# Patient Record
Sex: MALE | Race: WHITE | NOT HISPANIC OR LATINO | Employment: FULL TIME | URBAN - METROPOLITAN AREA
[De-identification: names, ages, dates, MRNs, and addresses within clinical notes are randomized per-mention and may not be internally consistent; named-entity substitution may affect disease eponyms.]

---

## 2018-03-27 ENCOUNTER — HOSPITAL ENCOUNTER (EMERGENCY)
Facility: OTHER | Age: 78
Discharge: HOME | End: 2018-03-27
Attending: EMERGENCY MEDICINE
Payer: COMMERCIAL

## 2018-03-27 ENCOUNTER — OFFICE VISIT (OUTPATIENT)
Dept: URGENT CARE | Facility: CLINIC | Age: 78
End: 2018-03-27
Payer: COMMERCIAL

## 2018-03-27 VITALS
TEMPERATURE: 98 F | RESPIRATION RATE: 16 BRPM | HEIGHT: 67 IN | BODY MASS INDEX: 21.82 KG/M2 | DIASTOLIC BLOOD PRESSURE: 74 MMHG | SYSTOLIC BLOOD PRESSURE: 140 MMHG | HEART RATE: 82 BPM | WEIGHT: 139 LBS | OXYGEN SATURATION: 98 %

## 2018-03-27 VITALS
TEMPERATURE: 98 F | SYSTOLIC BLOOD PRESSURE: 156 MMHG | HEIGHT: 68 IN | HEART RATE: 75 BPM | WEIGHT: 139 LBS | BODY MASS INDEX: 21.07 KG/M2 | DIASTOLIC BLOOD PRESSURE: 76 MMHG | OXYGEN SATURATION: 99 % | RESPIRATION RATE: 18 BRPM

## 2018-03-27 DIAGNOSIS — R60.0 EDEMA OF UPPER EXTREMITY: Primary | ICD-10-CM

## 2018-03-27 DIAGNOSIS — I89.0 LYMPHEDEMA: Primary | ICD-10-CM

## 2018-03-27 LAB
ALBUMIN SERPL BCP-MCNC: 3.6 G/DL
ALP SERPL-CCNC: 64 U/L
ALT SERPL W/O P-5'-P-CCNC: 26 U/L
ANION GAP SERPL CALC-SCNC: 10 MMOL/L
AST SERPL-CCNC: 27 U/L
BASOPHILS # BLD AUTO: 0.01 K/UL
BASOPHILS NFR BLD: 0.2 %
BILIRUB SERPL-MCNC: 0.5 MG/DL
BUN SERPL-MCNC: 21 MG/DL
CALCIUM SERPL-MCNC: 9.7 MG/DL
CHLORIDE SERPL-SCNC: 106 MMOL/L
CO2 SERPL-SCNC: 24 MMOL/L
CREAT SERPL-MCNC: 1 MG/DL
DIFFERENTIAL METHOD: ABNORMAL
EOSINOPHIL # BLD AUTO: 0 K/UL
EOSINOPHIL NFR BLD: 0.6 %
ERYTHROCYTE [DISTWIDTH] IN BLOOD BY AUTOMATED COUNT: 13.2 %
EST. GFR  (AFRICAN AMERICAN): >60 ML/MIN/1.73 M^2
EST. GFR  (NON AFRICAN AMERICAN): >60 ML/MIN/1.73 M^2
GLUCOSE SERPL-MCNC: 92 MG/DL
HCT VFR BLD AUTO: 41.3 %
HGB BLD-MCNC: 13.5 G/DL
LYMPHOCYTES # BLD AUTO: 1 K/UL
LYMPHOCYTES NFR BLD: 19.7 %
MCH RBC QN AUTO: 30.5 PG
MCHC RBC AUTO-ENTMCNC: 32.7 G/DL
MCV RBC AUTO: 93 FL
MONOCYTES # BLD AUTO: 0.2 K/UL
MONOCYTES NFR BLD: 4.8 %
NEUTROPHILS # BLD AUTO: 3.7 K/UL
NEUTROPHILS NFR BLD: 74.7 %
PLATELET # BLD AUTO: 203 K/UL
PMV BLD AUTO: 9.6 FL
POTASSIUM SERPL-SCNC: 4.2 MMOL/L
PROT SERPL-MCNC: 8.6 G/DL
RBC # BLD AUTO: 4.42 M/UL
SODIUM SERPL-SCNC: 140 MMOL/L
WBC # BLD AUTO: 4.98 K/UL

## 2018-03-27 PROCEDURE — 99205 OFFICE O/P NEW HI 60 MIN: CPT | Mod: S$GLB,,, | Performed by: FAMILY MEDICINE

## 2018-03-27 PROCEDURE — 85025 COMPLETE CBC W/AUTO DIFF WBC: CPT

## 2018-03-27 PROCEDURE — 80053 COMPREHEN METABOLIC PANEL: CPT

## 2018-03-27 PROCEDURE — 99284 EMERGENCY DEPT VISIT MOD MDM: CPT

## 2018-03-27 RX ORDER — ERGOCALCIFEROL 1.25 MG/1
50000 CAPSULE ORAL
COMMUNITY

## 2018-03-27 RX ORDER — BIMATOPROST 0.3 MG/ML
1 SOLUTION/ DROPS OPHTHALMIC NIGHTLY
COMMUNITY

## 2018-03-27 NOTE — ED TRIAGE NOTES
Pt c/o left hand, wrist & lower forearm swelling which he first yesterday morning when his watch did not fit properly. Pt reports having a biopsy 3 weeks ago in which they placed an IV in the left hand. Pt denies pain, numbness, & tingling. Pt reports joints stiffness secondary to swelling upon making a fist. PT denies SOB. Pt sent for further eval for r/o DVT.

## 2018-03-27 NOTE — PATIENT INSTRUCTIONS
Deep Vein Thrombosis (DVT)    Deep vein thrombosis (DVT) occurs when a blood clot (thrombus) forms in a deep vein. This happens most often in the leg. It can also happen in the arms or other parts of the body. A part of the clot called an embolus can break off and travel to the lungs. When this happens, its called a pulmonary embolism (PE). PE is a medical emergency. It can cut off blood flow and lead to death. Both DVT and PE are closely related. Together, they are often referred to by the term venous thromboembolism (VTE).  Risk factors for DVT  Anything that slows blood flow, injures the lining of a vein, or increases blood clotting can make you more prone to having DVT. This includes the following:  · Long periods without movement (such as when sitting for many hours at a time or when recovering from major surgery or illness)  · Estrogen (female hormone) therapy, such as hormone replacement therapy (HRT) or oral contraceptives  · Fractured hip or leg  · Major surgery or joint replacement  · Major trauma or spinal cord injury  · Cancer  · Family history  · Excess weight or obesity  · Smoking  · Older age  Symptoms  DVT does not always cause symptoms. When symptoms do occur, they may appear around the site of the DVT, such as in the leg. Possible symptoms include:  · Swelling  · Pain  · Warmth  · Redness  · Tenderness  Home care  · You were likely prescribed blood thinners (anticoagulants). They may be given as pills (oral) or shots (injections). Follow all instructions when using these medicines. Note: Do not take blood thinners with other medicines, herbal remedies, or supplements without talking to your provider first. Certain medicines or products can affect how blood thinners work.  · Follow your providers instructions about activity and rest.  · If support or compression stockings are prescribed, wear them as directed. These may help improve blood flow in the legs.  · When sitting or lying down, move  your ankles, toes and knees often. This may also help improve blood flow in the legs.  Follow-up care  Follow up with your healthcare provider, or as advised. If imaging tests were done, they may need further review by a doctor. You will be told of any new findings that may affect your care.  When to seek medical advice  Call your healthcare provider right away if any of these occur:  · New or increased swelling, pain, tenderness, warmth, or redness, in the leg, arm, or other area  · Blood in the urine  · Bleeding with bowel movements  Call 911  Call 911 right away if any of these occur:  · Bleeding from the nose, gums, a cut, or vagina  · Heavy or uncontrolled bleeding  · Trouble breathing  · Chest pain or discomfort that worsens with deep breathing or coughing  · Coughing (may cough up blood)  · Fast heartbeat  · Sweating  · Anxiety  · Lightheadedness, dizziness, or fainting  Date Last Reviewed: 9/21/2015  © 5972-6157 [employer]. [address] [place] [address], [place] [address]. All rights reserved. This information is not intended as a substitute for professional medical care. Always follow your healthcare professional's instructions.      PLease proceed to ER to Rule out upper extremity DVT

## 2018-03-27 NOTE — PROGRESS NOTES
"Subjective:       Patient ID: Patrick Kitchen is a 77 y.o. male.    Vitals:  height is 5' 7" (1.702 m) and weight is 63 kg (139 lb). His temperature is 98 °F (36.7 °C). His blood pressure is 140/74 (abnormal) and his pulse is 82. His respiration is 16 and oxygen saturation is 98%.     Chief Complaint: Arm Swelling    Pt presents today with left arm swelling since yesterday. Pt states he noticed it yesterday morning and has been getting worse. Pt states it is the first time it swells up like that. Pt states he has feels some tightness in his wrist and hand when he makes a fist and tries to twist his fist. Pt states he also has some itchiness on the dorsal side of his palm. Pt states he doesn't have any heart issues and denies any current chest pain or any other systemic symptoms.       Review of Systems   Constitution: Negative for chills.   HENT: Negative for congestion.    Eyes: Negative for pain.   Cardiovascular: Negative for cyanosis.   Skin: Negative for dry skin.   Musculoskeletal: Negative for back pain.   Gastrointestinal: Negative for abdominal pain.   Genitourinary: Negative for dysuria.       Objective:      Physical Exam   Constitutional: He is oriented to person, place, and time. He appears well-developed and well-nourished.   HENT:   Head: Normocephalic and atraumatic. Head is without abrasion, without contusion and without laceration.   Right Ear: External ear normal.   Left Ear: External ear normal.   Nose: Nose normal.   Mouth/Throat: Oropharynx is clear and moist.   Eyes: Conjunctivae, EOM and lids are normal. Pupils are equal, round, and reactive to light.   Neck: Trachea normal, full passive range of motion without pain and phonation normal. Neck supple.   Cardiovascular: Normal rate, regular rhythm and normal heart sounds.    Pulmonary/Chest: Effort normal and breath sounds normal. No stridor. No respiratory distress.   Musculoskeletal: Normal range of motion.   Neurological: He is alert and " oriented to person, place, and time.   Skin: Skin is warm, dry and intact. Capillary refill takes less than 2 seconds. No abrasion, no bruising, no burn, no ecchymosis, no laceration, no lesion and no rash noted. No erythema.        Swelling of left upper extremity from fingers to elbow.  1+pitting edema.  Mild erythema  nuerovascularly intact   Psychiatric: He has a normal mood and affect. His speech is normal and behavior is normal. Judgment and thought content normal. Cognition and memory are normal.   Nursing note and vitals reviewed.      Assessment:       1. Edema of upper extremity        Plan:         Edema of upper extremity  -     Refer to Emergency Dept.      Patient sent to ER for further studies to r/o upper extremity DVT.  Likely needs US with doppler flow

## 2018-03-27 NOTE — ED PROVIDER NOTES
"Encounter Date: 3/27/2018       History     Chief Complaint   Patient presents with    Arm Swelling     pt with c/o left arm swelling x 2 days . pt denies pain in arm.      Patient is 77 year old male who presents with complaints of left arm swelling that has been present since yesterday. He reports noticing arm swelling yesterday upon waking when his watch was a little tighter than normal.  The course of the day began to continue swelling.  Upon waking this morning it seems a little more swollen he presented to urgent care and was referred to the emergency department for further evaluation.  He reports no trauma or injury or pain to the affected arm.  Denies chest pain, shortness of breath, dizziness or altered mental status.  Does report having some lower extremity edema but that is been present for a while and is not acute.  He also admits that he had a prostate biopsy a few months ago and believes he had an IV placed in the affected arm but reports no bad outcome from his IV insertion and sustained no swelling or pain at the time of this event.  He denies fever, chills, nausea, vomiting, chest pain, shortness of breath.  He is currently unaccompanied in the ER.  Of note he is a resident of [place] and is planning to leave [place] 3 days from now.          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  Social History   Substance Use Topics    Smoking status: Never Smoker    Smokeless tobacco: Never Used    Alcohol use Yes      Comment: seldom     Review of Systems   Constitutional: Negative for fever.   HENT: Negative for sore throat.    Respiratory: Negative for shortness of " breath.    Cardiovascular: Negative for chest pain.   Gastrointestinal: Negative for nausea.   Genitourinary: Negative for dysuria.   Musculoskeletal: Negative for back pain.        Left arm edema   Skin: Negative for rash.   Neurological: Negative for weakness.   Hematological: Does not bruise/bleed easily.       Physical Exam     Initial Vitals [03/27/18 1101]   BP Pulse Resp Temp SpO2   (!) 156/76 75 18 97.6 °F (36.4 °C) 99 %      MAP       102.67         Physical Exam    Nursing note and vitals reviewed.  Constitutional: He appears well-developed and well-nourished. He is not diaphoretic. No distress.   Healthy appearing male in no acute distress or apparent pain.  He makes good eye contact, speaks in clear full sentences and ambulates with ease   HENT:   Head: Normocephalic and atraumatic.   Eyes: Conjunctivae and EOM are normal. Pupils are equal, round, and reactive to light. Right eye exhibits no discharge. Left eye exhibits no discharge. No scleral icterus.   Neck: Normal range of motion.   Cardiovascular: Normal rate, regular rhythm and normal heart sounds. Exam reveals no gallop and no friction rub.    No murmur heard.  Pulmonary/Chest: Breath sounds normal. He has no wheezes. He has no rhonchi. He has no rales.   Abdominal: Soft. Bowel sounds are normal. There is no tenderness. There is no rebound and no guarding.   Musculoskeletal: Normal range of motion. He exhibits no edema or tenderness.   Left arm is edematous from finger tip extending proximally just above the left elbow. There is normal pulse, MARIBEL and ROM of wrist, elbow and shoulder. There is no overlying skin changes to left upper extremity especially no erythema or warmth to touch.     There is 1+ pitting edema to bilateral lower extremities.        Lymphadenopathy:     He has no cervical adenopathy.   Neurological: He is alert and oriented to person, place, and time. He has normal strength. No cranial nerve deficit or sensory deficit.   Skin:  Skin is warm. Capillary refill takes less than 2 seconds. No rash and no abscess noted. No erythema.   Psychiatric: He has a normal mood and affect. His behavior is normal. Thought content normal.         ED Course   Procedures  Labs Reviewed - No data to display     Imaging Results          US Upper Extremity Veins Left (Final result)  Result time 03/27/18 13:38:15   Procedure changed from US Upper Extremity Veins Right     Final result by Musa Fong MD (03/27/18 13:38:15)                 Impression:      No evidence of left upper extremity DVT.    Incidental bulky lymphadenopathy in the left chest, consistent with patient's given history of B-cell lymphoma.  Further evaluation as clinically indicated.      Electronically signed by: Musa Fong MD  Date:    03/27/2018  Time:    13:38             Narrative:    EXAMINATION:  US UPPER EXTREMITY VEINS LEFT    CLINICAL HISTORY:  left arm swelling;    TECHNIQUE:  Sonographic grayscale and color Doppler images were obtained of the left upper extremity veins.    COMPARISON:  None    FINDINGS:  The left jugular, axillary, subclavian, brachial, basilic, and cephalic veins are all grossly patent.  There are numerous large masses noted in the left chest.                              Labs Reviewed   CBC W/ AUTO DIFFERENTIAL - Abnormal; Notable for the following:        Result Value    RBC 4.42 (*)     Hemoglobin 13.5 (*)     Mono # 0.2 (*)     Gran% 74.7 (*)     All other components within normal limits   COMPREHENSIVE METABOLIC PANEL - Abnormal; Notable for the following:     Total Protein 8.6 (*)     All other components within normal limits          Medical Decision Making:   ED Management:  Urgent evaluation a 77-year-old male who presents with complaints most consistent with left-sided lymphedema to upper extremity.  He is afebrile, nontoxic appearing, hemodynamically stable.  Physical exam outlined above and reveals no concern for cellulitis or acute osseous  process.  Ultrasound reveals no evidence of DVT but there is bulky lymphadenopathy appreciated.  Given history of B-cell lymphoma this is concerning and patient is encouraged to follow up with his hematology oncology once he returns home within the next week.  No further emergent workup felt warranted at this time.  He verbalizes understanding and is amenable to plan.  Case discussed with attending who agrees with plan.  Other:   I have discussed this case with another health care provider.       <> Summary of the Discussion: Viral                      Clinical Impression:   The encounter diagnosis was Lymphedema.                           April Layne PA-C  03/27/18 [id number]

## 2018-03-27 NOTE — ED NOTES
Patient Identifiers for Patrick Kitchen checked and correct  LOC: The patient is awake, alert and aware of environment with an appropriate affect, the patient is oriented x 3 and speaking appropriate.  APPEARANCE: Elderly.  Patient resting comfortably and in no acute distress. The patient is clean and well groomed. The patient's clothing is properly fastened.  SKIN: The skin is warm and dry. The patient has normal skin turgor and moist mucus membranes. No rashes or lesions upon observation. Skin Intact , no breakdown noted.  Musculoskeletal :  Normal range of motion noted. Moves all extremities well. Marked swelling of the left hand, wrist & lower forearm. No palpation tenderness of the left hand &  LUE. Pt has strong hand , equal bilaterally. Cap refill of fingers = 2 seconds.  RESPIRATORY: Airway is open and patent, respirations are spontaneous, patient has a normal effort and rate. Breath sounds are clear & equal, bilaterally.  PULSES: 2+ radial  pulses, symmetrical .    Will continue to monitor